# Patient Record
Sex: FEMALE | Race: WHITE | NOT HISPANIC OR LATINO | URBAN - METROPOLITAN AREA
[De-identification: names, ages, dates, MRNs, and addresses within clinical notes are randomized per-mention and may not be internally consistent; named-entity substitution may affect disease eponyms.]

---

## 2020-07-25 ENCOUNTER — TELEPHONE ENCOUNTER (OUTPATIENT)
Dept: URBAN - METROPOLITAN AREA CLINIC 13 | Facility: CLINIC | Age: 65
End: 2020-07-25

## 2020-07-26 ENCOUNTER — TELEPHONE ENCOUNTER (OUTPATIENT)
Dept: URBAN - METROPOLITAN AREA CLINIC 13 | Facility: CLINIC | Age: 65
End: 2020-07-26

## 2020-07-26 RX ORDER — PROMETHAZINE HYDROCHLORIDE 25 MG/1
TABLET ORAL
Qty: 30 | Refills: 0 | Status: ACTIVE | COMMUNITY
Start: 2013-09-25

## 2020-07-26 RX ORDER — PENCICLOVIR 10 MG/G
CREAM TOPICAL
Qty: 20 | Refills: 0 | Status: ACTIVE | COMMUNITY
Start: 2014-11-14

## 2020-07-26 RX ORDER — CIPROFLOXACIN HYDROCHLORIDE 500 MG/1
TABLET, FILM COATED ORAL
Qty: 20 | Refills: 0 | Status: ACTIVE | COMMUNITY
Start: 2014-11-06

## 2020-07-26 RX ORDER — ACYCLOVIR 400 MG/1
TAKE 1 TABLET DAILY TABLET ORAL
Refills: 0 | Status: ACTIVE | COMMUNITY
Start: 2014-04-06

## 2020-07-26 RX ORDER — HYDROCODONE BITARTRATE AND ACETAMINOPHEN 7.5; 325 MG/1; MG/1
TABLET ORAL
Qty: 60 | Refills: 0 | Status: ACTIVE | COMMUNITY
Start: 2014-08-20

## 2020-07-26 RX ORDER — METHOCARBAMOL 500 MG/1
TABLET, FILM COATED ORAL
Qty: 30 | Refills: 0 | Status: ACTIVE | COMMUNITY
Start: 2014-04-30

## 2020-07-26 RX ORDER — OXYCODONE AND ACETAMINOPHEN 10; 325 MG/1; MG/1
TABLET ORAL
Qty: 90 | Refills: 0 | Status: ACTIVE | COMMUNITY
Start: 2014-11-07

## 2020-07-26 RX ORDER — PROMETHAZINE HYDROCHLORIDE 25 MG/1
TABLET ORAL
Qty: 30 | Refills: 0 | Status: ACTIVE | COMMUNITY
Start: 2014-02-19

## 2020-07-26 RX ORDER — PENCICLOVIR 10 MG/G
USE AS DIRECTED CREAM TOPICAL
Refills: 0 | Status: ACTIVE | COMMUNITY

## 2020-07-26 RX ORDER — RIZATRIPTAN BENZOATE 10 MG
TAKE 1 TABLET AT ONSET OF HEADACHE. MAY REPEAT EVERY 2 HOURS AS NEEDED. MAXIMUM 3 TABLETS IN 24 HOURS TABLET ORAL
Refills: 0 | Status: ACTIVE | COMMUNITY

## 2020-07-26 RX ORDER — ARMODAFINIL 150 MG/1
TAKE 1 TABLET DAILY TABLET ORAL
Refills: 0 | Status: ACTIVE | COMMUNITY

## 2020-07-26 RX ORDER — CYCLOBENZAPRINE HYDROCHLORIDE 10 MG/1
TABLET, FILM COATED ORAL
Qty: 90 | Refills: 0 | Status: ACTIVE | COMMUNITY
Start: 2013-08-01

## 2020-07-26 RX ORDER — ZOLPIDEM TARTRATE 12.5 MG/1
TABLET, EXTENDED RELEASE ORAL
Qty: 30 | Refills: 0 | Status: ACTIVE | COMMUNITY
Start: 2014-04-30

## 2020-07-26 RX ORDER — SODIUM FLUORIDE 6 MG/ML
PASTE, DENTIFRICE DENTAL
Qty: 100 | Refills: 0 | Status: ACTIVE | COMMUNITY
Start: 2014-04-04

## 2020-07-26 RX ORDER — 1.1% SODIUM FLUORIDE PRESCRIPTION DENTAL CREAM 5 MG/G
CREAM DENTAL
Qty: 51 | Refills: 0 | Status: ACTIVE | COMMUNITY
Start: 2014-01-17

## 2020-07-26 RX ORDER — DICLOFENAC SODIUM 50 MG/1
TABLET, DELAYED RELEASE ORAL
Qty: 60 | Refills: 0 | Status: ACTIVE | COMMUNITY
Start: 2014-07-18

## 2020-07-26 RX ORDER — CYCLOBENZAPRINE HYDROCHLORIDE 10 MG/1
TABLET, FILM COATED ORAL
Qty: 90 | Refills: 0 | Status: ACTIVE | COMMUNITY
Start: 2014-04-04

## 2020-07-26 RX ORDER — PHENTERMINE HYDROCHLORIDE 30 MG/1
TAKE 1 TABLET DAILY TABLET ORAL
Refills: 0 | Status: ACTIVE | COMMUNITY

## 2020-07-26 RX ORDER — SODIUM FLUORIDE 6 MG/ML
PASTE, DENTIFRICE DENTAL
Qty: 100 | Refills: 0 | Status: ACTIVE | COMMUNITY
Start: 2014-05-27

## 2020-07-26 RX ORDER — ALPRAZOLAM 1 MG/1
TABLET ORAL
Qty: 60 | Refills: 0 | Status: ACTIVE | COMMUNITY
Start: 2014-08-20

## 2020-07-26 RX ORDER — ZOLPIDEM TARTRATE 12.5 MG/1
TAKE 1 TABLET AT BEDTIME AS NEEDED FOR SLEEP TABLET, EXTENDED RELEASE ORAL
Refills: 0 | Status: ACTIVE | COMMUNITY

## 2020-07-26 RX ORDER — HYDROCODONE BITARTRATE AND ACETAMINOPHEN 5; 325 MG/1; MG/1
TABLET ORAL
Qty: 20 | Refills: 0 | Status: ACTIVE | COMMUNITY
Start: 2014-08-08

## 2020-07-26 RX ORDER — SODIUM FLUORIDE 6 MG/ML
PASTE, DENTIFRICE DENTAL
Qty: 100 | Refills: 0 | Status: ACTIVE | COMMUNITY
Start: 2014-01-17

## 2020-07-26 RX ORDER — TRAMADOL HYDROCHLORIDE 50 MG/1
TABLET ORAL
Qty: 30 | Refills: 0 | Status: ACTIVE | COMMUNITY
Start: 2014-04-25

## 2020-07-26 RX ORDER — DIAZEPAM 10 MG/1
TAKE 1 TABLET DAILY AS DIRECTED TABLET ORAL
Refills: 0 | Status: ACTIVE | COMMUNITY

## 2020-07-26 RX ORDER — CHLORHEXIDINE GLUCONATE 0.12% ORAL RINSE 1.2 MG/ML
SOLUTION ORAL
Qty: 255 | Refills: 0 | Status: ACTIVE | COMMUNITY
Start: 2014-11-14

## 2020-07-26 RX ORDER — ALPRAZOLAM 1 MG
TAKE 1 TABLET 3 TIMES DAILY TABLET ORAL
Refills: 0 | Status: ACTIVE | COMMUNITY

## 2020-07-26 RX ORDER — KETOROLAC TROMETHAMINE 10 MG/1
TABLET, FILM COATED ORAL
Qty: 16 | Refills: 0 | Status: ACTIVE | COMMUNITY
Start: 2014-04-25

## 2020-09-11 ENCOUNTER — WEB ENCOUNTER (OUTPATIENT)
Dept: URBAN - METROPOLITAN AREA CLINIC 113 | Facility: CLINIC | Age: 65
End: 2020-09-11

## 2020-09-21 ENCOUNTER — LAB OUTSIDE AN ENCOUNTER (OUTPATIENT)
Dept: URBAN - METROPOLITAN AREA CLINIC 113 | Facility: CLINIC | Age: 65
End: 2020-09-21

## 2020-09-21 ENCOUNTER — WEB ENCOUNTER (OUTPATIENT)
Dept: URBAN - METROPOLITAN AREA CLINIC 113 | Facility: CLINIC | Age: 65
End: 2020-09-21

## 2020-09-21 ENCOUNTER — OFFICE VISIT (OUTPATIENT)
Dept: URBAN - METROPOLITAN AREA CLINIC 113 | Facility: CLINIC | Age: 65
End: 2020-09-21

## 2020-09-21 ENCOUNTER — OFFICE VISIT (OUTPATIENT)
Dept: URBAN - METROPOLITAN AREA CLINIC 113 | Facility: CLINIC | Age: 65
End: 2020-09-21
Payer: COMMERCIAL

## 2020-09-21 VITALS
HEART RATE: 85 BPM | DIASTOLIC BLOOD PRESSURE: 71 MMHG | RESPIRATION RATE: 20 BRPM | TEMPERATURE: 97.1 F | BODY MASS INDEX: 26.46 KG/M2 | WEIGHT: 155 LBS | SYSTOLIC BLOOD PRESSURE: 116 MMHG | HEIGHT: 64 IN

## 2020-09-21 DIAGNOSIS — K21.9 GASTROESOPHAGEAL REFLUX DISEASE WITHOUT ESOPHAGITIS: ICD-10-CM

## 2020-09-21 DIAGNOSIS — K22.2 ESOPHAGEAL STENOSIS: ICD-10-CM

## 2020-09-21 DIAGNOSIS — R19.4 CHANGE IN BOWEL HABITS: ICD-10-CM

## 2020-09-21 DIAGNOSIS — R13.10 ESOPHAGEAL DYSPHAGIA: ICD-10-CM

## 2020-09-21 PROCEDURE — 99214 OFFICE O/P EST MOD 30 MIN: CPT | Performed by: NURSE PRACTITIONER

## 2020-09-21 PROCEDURE — G8427 DOCREV CUR MEDS BY ELIG CLIN: HCPCS | Performed by: NURSE PRACTITIONER

## 2020-09-21 RX ORDER — LEVOTHYROXINE SODIUM 50 UG/1
1 TABLET IN THE MORNING ON AN EMPTY STOMACH TABLET ORAL ONCE A DAY
Status: ACTIVE | COMMUNITY

## 2020-09-21 RX ORDER — ACYCLOVIR 400 MG/1
1 CAPSULE TABLET ORAL ONCE DAILY
Refills: 0 | Status: ACTIVE | COMMUNITY
Start: 2014-04-06

## 2020-09-21 RX ORDER — AMITRIPTYLINE HYDROCHLORIDE 150 MG/1
1 TABLET AT BEDTIME TABLET, FILM COATED ORAL ONCE A DAY
Status: ACTIVE | COMMUNITY

## 2020-09-21 RX ORDER — ESOMEPRAZOLE MAGNESIUM 40 MG/1
1 CAPSULE CAPSULE, DELAYED RELEASE ORAL ONCE A DAY
Status: ACTIVE | COMMUNITY

## 2020-09-21 RX ORDER — POLYETHYLENE GLYCOL 3350, SODIUM CHLORIDE, SODIUM BICARBONATE, POTASSIUM CHLORIDE 420; 11.2; 5.72; 1.48 G/4L; G/4L; G/4L; G/4L
AS DIRECTED POWDER, FOR SOLUTION ORAL ONCE
Qty: 1 BOTTLE | Refills: 0 | OUTPATIENT

## 2020-09-21 RX ORDER — FROVATRIPTAN SUCCINATE 2.5 MG/1
1 TABLET TABLET, FILM COATED ORAL ONCE A DAY
Status: ACTIVE | COMMUNITY

## 2020-09-21 RX ORDER — ONDANSETRON HYDROCHLORIDE 4 MG/1
1 TABLET TABLET, FILM COATED ORAL ONCE A DAY
Status: ACTIVE | COMMUNITY

## 2020-09-21 NOTE — HPI-TODAY'S VISIT:
This is a 65-year-old female with a history of migraines, sleep apnea, rheumatoid arthritis, GERD, esophageal stenosis status post dilation (2018), and alternating bowel habits presenting after a long interval for evaluation of esophageal spasm.    She was last seen 10/5/2018 following an EGD notable for a benign-appearing esophageal stenosis status post dilation, small hiatal hernia, and chronic gastritis negative for H. pylori.  She reported one episode of dysphagia following EGD otherwise she was asymptomatic.  She was taking Nexium twice a day and reported occasional breakthrough symptoms.  She was instructed to continue twice daily Nexium.  Colace or MiraLAX was recommended for constipation.  Colon cancer screening is up-to-date and due in April 2021.  She has been having difficulty swallowing allergy medication.  Recently, she reports 2 episodes during which meat briefly lodged in her esophagus relieved with drinking water. She is taking Nexium daily.  She has breakthrough heartburn less than or equal to twice a week.  She has occasional mild nausea without vomiting.  She reports a change in bowel habits.  She has been taking MiraLAX 1 capful daily for constipation.  In the last 3 months, she reports initial hard stools that require manual removal or pressing near her anus in order to pass the first portion of the bowel movement.  She takes Metamucil intermittently if she has not had a bowel movement for 2 or 3 days.  She denies red blood per rectum or any other abdominal symptoms.

## 2020-10-06 ENCOUNTER — CLAIMS CREATED FROM THE CLAIM WINDOW (OUTPATIENT)
Dept: URBAN - METROPOLITAN AREA CLINIC 4 | Facility: CLINIC | Age: 65
End: 2020-10-06
Payer: COMMERCIAL

## 2020-10-06 ENCOUNTER — OFFICE VISIT (OUTPATIENT)
Dept: URBAN - METROPOLITAN AREA SURGERY CENTER 25 | Facility: SURGERY CENTER | Age: 65
End: 2020-10-06
Payer: COMMERCIAL

## 2020-10-06 DIAGNOSIS — K22.2 ACQUIRED ESOPHAGEAL RING: ICD-10-CM

## 2020-10-06 DIAGNOSIS — K31.89 ACQUIRED DEFORMITY OF DUODENUM: ICD-10-CM

## 2020-10-06 DIAGNOSIS — K29.60 OTHER GASTRITIS WITHOUT BLEEDING: ICD-10-CM

## 2020-10-06 PROCEDURE — 43239 EGD BIOPSY SINGLE/MULTIPLE: CPT | Performed by: INTERNAL MEDICINE

## 2020-10-06 PROCEDURE — 88312 SPECIAL STAINS GROUP 1: CPT | Performed by: PATHOLOGY

## 2020-10-06 PROCEDURE — G8907 PT DOC NO EVENTS ON DISCHARG: HCPCS | Performed by: INTERNAL MEDICINE

## 2020-10-06 PROCEDURE — 43248 EGD GUIDE WIRE INSERTION: CPT | Performed by: INTERNAL MEDICINE

## 2020-10-06 PROCEDURE — 88305 TISSUE EXAM BY PATHOLOGIST: CPT | Performed by: PATHOLOGY

## 2020-10-06 RX ORDER — FROVATRIPTAN SUCCINATE 2.5 MG/1
1 TABLET TABLET, FILM COATED ORAL ONCE A DAY
Status: ACTIVE | COMMUNITY

## 2020-10-06 RX ORDER — LEVOTHYROXINE SODIUM 50 UG/1
1 TABLET IN THE MORNING ON AN EMPTY STOMACH TABLET ORAL ONCE A DAY
Status: ACTIVE | COMMUNITY

## 2020-10-06 RX ORDER — POLYETHYLENE GLYCOL 3350, SODIUM CHLORIDE, SODIUM BICARBONATE, POTASSIUM CHLORIDE 420; 11.2; 5.72; 1.48 G/4L; G/4L; G/4L; G/4L
AS DIRECTED POWDER, FOR SOLUTION ORAL ONCE
Qty: 1 BOTTLE | Refills: 0 | Status: ACTIVE | COMMUNITY

## 2020-10-06 RX ORDER — AMITRIPTYLINE HYDROCHLORIDE 150 MG/1
1 TABLET AT BEDTIME TABLET, FILM COATED ORAL ONCE A DAY
Status: ACTIVE | COMMUNITY

## 2020-10-06 RX ORDER — ONDANSETRON HYDROCHLORIDE 4 MG/1
1 TABLET TABLET, FILM COATED ORAL ONCE A DAY
Status: ACTIVE | COMMUNITY

## 2020-10-06 RX ORDER — ACYCLOVIR 400 MG/1
1 CAPSULE TABLET ORAL ONCE DAILY
Refills: 0 | Status: ACTIVE | COMMUNITY
Start: 2014-04-06

## 2020-10-06 RX ORDER — ESOMEPRAZOLE MAGNESIUM 40 MG/1
1 CAPSULE CAPSULE, DELAYED RELEASE ORAL ONCE A DAY
Status: ACTIVE | COMMUNITY

## 2020-11-10 ENCOUNTER — CLAIMS CREATED FROM THE CLAIM WINDOW (OUTPATIENT)
Dept: URBAN - METROPOLITAN AREA CLINIC 4 | Facility: CLINIC | Age: 65
End: 2020-11-10
Payer: COMMERCIAL

## 2020-11-10 ENCOUNTER — OFFICE VISIT (OUTPATIENT)
Dept: URBAN - METROPOLITAN AREA SURGERY CENTER 25 | Facility: SURGERY CENTER | Age: 65
End: 2020-11-10
Payer: COMMERCIAL

## 2020-11-10 DIAGNOSIS — D12.3 ADENOMA OF TRANSVERSE COLON: ICD-10-CM

## 2020-11-10 DIAGNOSIS — D12.4 ADENOMA OF DESCENDING COLON: ICD-10-CM

## 2020-11-10 DIAGNOSIS — D12.2 ADENOMA OF ASCENDING COLON: ICD-10-CM

## 2020-11-10 DIAGNOSIS — D12.2 BENIGN NEOPLASM OF ASCENDING COLON: ICD-10-CM

## 2020-11-10 DIAGNOSIS — D12.0 BENIGN NEOPLASM OF CECUM: ICD-10-CM

## 2020-11-10 DIAGNOSIS — D12.4 BENIGN NEOPLASM OF DESCENDING COLON: ICD-10-CM

## 2020-11-10 DIAGNOSIS — D12.3 BENIGN NEOPLASM OF TRANSVERSE COLON: ICD-10-CM

## 2020-11-10 DIAGNOSIS — K59.00 ACUTE CONSTIPATION: ICD-10-CM

## 2020-11-10 DIAGNOSIS — D12.0 ADENOMA OF CECUM: ICD-10-CM

## 2020-11-10 PROCEDURE — 45385 COLONOSCOPY W/LESION REMOVAL: CPT | Performed by: INTERNAL MEDICINE

## 2020-11-10 PROCEDURE — G9937 DIG OR SURV COLSCO: HCPCS | Performed by: INTERNAL MEDICINE

## 2020-11-10 PROCEDURE — G8907 PT DOC NO EVENTS ON DISCHARG: HCPCS | Performed by: INTERNAL MEDICINE

## 2020-11-10 PROCEDURE — 88305 TISSUE EXAM BY PATHOLOGIST: CPT | Performed by: PATHOLOGY

## 2020-11-10 RX ORDER — FROVATRIPTAN SUCCINATE 2.5 MG/1
1 TABLET TABLET, FILM COATED ORAL ONCE A DAY
Status: ACTIVE | COMMUNITY

## 2020-11-10 RX ORDER — AMITRIPTYLINE HYDROCHLORIDE 150 MG/1
1 TABLET AT BEDTIME TABLET, FILM COATED ORAL ONCE A DAY
Status: ACTIVE | COMMUNITY

## 2020-11-10 RX ORDER — ONDANSETRON HYDROCHLORIDE 4 MG/1
1 TABLET TABLET, FILM COATED ORAL ONCE A DAY
Status: ACTIVE | COMMUNITY

## 2020-11-10 RX ORDER — ESOMEPRAZOLE MAGNESIUM 40 MG/1
1 CAPSULE CAPSULE, DELAYED RELEASE ORAL ONCE A DAY
Status: ACTIVE | COMMUNITY

## 2020-11-10 RX ORDER — ACYCLOVIR 400 MG/1
1 CAPSULE TABLET ORAL ONCE DAILY
Refills: 0 | Status: ACTIVE | COMMUNITY
Start: 2014-04-06

## 2020-11-10 RX ORDER — LEVOTHYROXINE SODIUM 50 UG/1
1 TABLET IN THE MORNING ON AN EMPTY STOMACH TABLET ORAL ONCE A DAY
Status: ACTIVE | COMMUNITY

## 2020-11-10 RX ORDER — POLYETHYLENE GLYCOL 3350, SODIUM CHLORIDE, SODIUM BICARBONATE, POTASSIUM CHLORIDE 420; 11.2; 5.72; 1.48 G/4L; G/4L; G/4L; G/4L
AS DIRECTED POWDER, FOR SOLUTION ORAL ONCE
Qty: 1 BOTTLE | Refills: 0 | Status: ACTIVE | COMMUNITY

## 2020-12-23 ENCOUNTER — WEB ENCOUNTER (OUTPATIENT)
Dept: URBAN - METROPOLITAN AREA CLINIC 113 | Facility: CLINIC | Age: 65
End: 2020-12-23

## 2020-12-23 ENCOUNTER — DASHBOARD ENCOUNTERS (OUTPATIENT)
Age: 65
End: 2020-12-23

## 2020-12-23 ENCOUNTER — OFFICE VISIT (OUTPATIENT)
Dept: URBAN - METROPOLITAN AREA CLINIC 113 | Facility: CLINIC | Age: 65
End: 2020-12-23
Payer: COMMERCIAL

## 2020-12-23 VITALS
HEIGHT: 64 IN | TEMPERATURE: 98.1 F | WEIGHT: 154 LBS | SYSTOLIC BLOOD PRESSURE: 118 MMHG | RESPIRATION RATE: 18 BRPM | BODY MASS INDEX: 26.29 KG/M2 | HEART RATE: 91 BPM | DIASTOLIC BLOOD PRESSURE: 74 MMHG

## 2020-12-23 DIAGNOSIS — K57.30 COLON, DIVERTICULOSIS: ICD-10-CM

## 2020-12-23 DIAGNOSIS — R13.10 ESOPHAGEAL DYSPHAGIA: ICD-10-CM

## 2020-12-23 DIAGNOSIS — R19.4 CHANGE IN BOWEL HABITS: ICD-10-CM

## 2020-12-23 DIAGNOSIS — R13.19 CERVICAL DYSPHAGIA: ICD-10-CM

## 2020-12-23 DIAGNOSIS — K21.9 GASTROESOPHAGEAL REFLUX DISEASE WITHOUT ESOPHAGITIS: ICD-10-CM

## 2020-12-23 PROBLEM — 733657002: Status: ACTIVE | Noted: 2020-09-19

## 2020-12-23 PROBLEM — 266435005: Status: ACTIVE | Noted: 2020-09-19

## 2020-12-23 PROBLEM — 129851009: Status: ACTIVE | Noted: 2020-09-21

## 2020-12-23 PROBLEM — 300286002: Status: ACTIVE | Noted: 2020-09-19

## 2020-12-23 PROBLEM — 40890009: Status: ACTIVE | Noted: 2020-09-21

## 2020-12-23 PROBLEM — 429047008: Status: ACTIVE | Noted: 2020-12-23

## 2020-12-23 PROCEDURE — 99214 OFFICE O/P EST MOD 30 MIN: CPT | Performed by: INTERNAL MEDICINE

## 2020-12-23 RX ORDER — AMITRIPTYLINE HYDROCHLORIDE 150 MG/1
1 TABLET AT BEDTIME TABLET, FILM COATED ORAL ONCE A DAY
Status: ACTIVE | COMMUNITY

## 2020-12-23 RX ORDER — LEVOTHYROXINE SODIUM 50 UG/1
1 TABLET IN THE MORNING ON AN EMPTY STOMACH TABLET ORAL ONCE A DAY
Status: ACTIVE | COMMUNITY

## 2020-12-23 RX ORDER — FROVATRIPTAN SUCCINATE 2.5 MG/1
1 TABLET TABLET, FILM COATED ORAL ONCE A DAY
Status: ACTIVE | COMMUNITY

## 2020-12-23 RX ORDER — ESOMEPRAZOLE MAGNESIUM 40 MG/1
1 CAPSULE CAPSULE, DELAYED RELEASE ORAL ONCE A DAY
Status: ACTIVE | COMMUNITY

## 2020-12-23 RX ORDER — ONDANSETRON HYDROCHLORIDE 4 MG/1
1 TABLET TABLET, FILM COATED ORAL ONCE A DAY
Status: ACTIVE | COMMUNITY

## 2020-12-23 NOTE — HPI-TODAY'S VISIT:
This is a 65-year-old female with a history of migraines, sleep apnea, rheumatoid arthritis, GERD, esophageal stenosis status post dilation (2018), and alternating bowel habits presenting for follow-up.  She was last seen here on September 21, 2020 after a long interval for evaluation of esophageal spasm.    She was seen 10/5/2018 following an EGD notable for a benign-appearing esophageal stenosis status post dilation, small hiatal hernia, and chronic gastritis negative for H. pylori.  She reported one episode of dysphagia following EGD otherwise she was asymptomatic.  She was taking Nexium twice a day and reported occasional breakthrough symptoms.  She was instructed to continue twice daily Nexium.  Colace or MiraLAX was recommended for constipation.  Colon cancer screening is up-to-date and due in April 2021.  Recently, she has noted difficulty swallowing allergy medication.  At her last visit, she described 2 episodes during which meat briefly lodged in her esophagus which were both relieved with drinking water. She was taking Nexium daily.  She has breakthrough heartburn less than or equal to twice a week.  She has occasional mild nausea without vomiting.  She also reported a change in bowel habits.  She has been taking MiraLAX 1 capful daily for constipation.  In the last 3 months, she reports initial hard stools that required manual removal or pressing near her anus in order to pass the first portion of the bowel movement.  She takes Metamucil intermittently if she has not had a bowel movement for 2 or 3 days.  She denied red blood per rectum or any other abdominal symptoms.   After her last visit, she had upper GI endoscopy done on October 6, 2020.  She was dilated up to 48 [place] with savory dilator.  A small hiatal hernia was also noted.  Her swallowing has improved significantly since this.  The patient underwent colonoscopy on November 10, 2020.  This showed left colon diverticulosis and a total of 6 polyps ranging between 7 and 10 mm in the descending, splenic flexure, ascending colon and cecum.  These were all tubular adenomas or tubulovillous adenomas.  A 3 year follow-up recommended.  The patient is using MiraLAX daily, 1 capful.  She uses Dulcolax when necessary if she becomes constipated.  This regimen seems to be helping her constipation.  She would like to decrease her proton pump inhibitor therapy to once daily.